# Patient Record
(demographics unavailable — no encounter records)

---

## 2024-10-24 NOTE — HISTORY OF PRESENT ILLNESS
[FreeTextEntry1] : HISTORY OF PRESENT ILLNESS: Mr. Mcclellan is a 58-year-old male complaining of right hip pain. The pain started after no specific injury or event.  The patient has had this pain for months.  Patient describes the pain as moderate to severe.  During the last month the pain has been nearly constant with symptoms worsening in no typical pattern.   Pain described as dull, achy and sharp hip pain which refers into his testicles at times worse with standing and walking, better with sitting without associated numbness, tingling or radicular pain for (6 weeks). Pain is 8/10 in intensity. He has tried NSAIDs without significant relief. Patient denies locking, clicking, instability, falls.  PRESENTING TODAY 10-: Patient presents to the office today for a follow up visit with continued low back and right hip pain that is dull, achy and sharp with radicular features into his testicles at times worse with standing and walking, better with sitting.  Reviewed MRI of his lumbar spine in detail during today's visit as documented below.  He continues with low back pain that remains axial across low back which radiates into his left buttock and posterior thigh, as well as his right buttock/hip.  Pain is an 8/10 pain intensity. Patient denies any bowel or bladder dysfunction, incontinence, or saddle anesthesia.

## 2024-10-24 NOTE — DATA REVIEWED
[FreeTextEntry1] : MRI lumbar spine dated 10/17/2024 suggests, L2-3 disc bulge with superimposed inferiorly migrating left paracentral/foraminal disc extrusion partially effacing the left lateral recess and impinging on the descending left L3 nerve root.  L3-4 moderate-severe spinal stenosis and moderate bilateral foraminal stenosis.  L4-5 disc bulge, mild ligamentum flavum thickening, and mild bilateral facet hypertrophy along with 1 cm cystic lesion arising from the medial aspect of the right L4-5 facet joint (synovial versus flaval cyst) resulting in severe spinal stenosis with impingement of the cauda equina.

## 2024-10-24 NOTE — PHYSICAL EXAM
[de-identified] : LUMBAR SPINE- Inspection:  erythema (-) ecchymosis (-)       Palpation:  Midline lumbar tenderness: (-)    paraspinal tenderness: L (-) ; R (-)     SI joint tenderness: L (-) ; R (-)     GTB tenderness: L (-);  R (-)         Special Tests:  SLR: R (-) ; L (+)     Facet loading: R (-) ; L (-)     MICA test: R (-) ; L (-)    Gaenslen's: R (-) ; L (-)     compression test: R (-) ; L (-)         Gait:  non- antalgic gait

## 2024-10-24 NOTE — DISCUSSION/SUMMARY
[de-identified] : A discussion regarding available pain management treatment options occurred with the patient. These included interventional, rehabilitative, pharmacological, and alternative modalities. We will proceed with the following:   Interventional treatment options:  1.  Patient will proceed with a bilateral TFESI L4-5 with MAC. Treatment options were discussed with the patient. The patient has been having persistent lower back and lumbar radicular pain with minimal improvement with conservative therapies. The patient was given the option to proceed with a lumbar transforaminal epidural steroid injection to try to get some pain relief.        The risks and benefits were discussed which included bleeding, infection, nerve injury, no pain relief or worse, increased pain. All questions were answered and concerns addressed.  Risk, benefits, pros and cons of procedure were explained to the patient using models and diagrams and their questions were answered.   The patient has severe anxiety of procedures that necessitates monitored anesthesia care (MAC). The procedure performed will be close to major nerves, arteries, and spinal cord and/or joint structures. Due to the proximity of these structures, we need the patient to be still during the procedure.  With the help of MAC, this will be safely achieved and decrease the risk of any complications.   *Will Send an anti-diabetic clearance to Dr. Jimenez to proceed with ANTONIO.   Rehabilitative options: -Participation in active HEP was discussed and printed. Patient given specific exercises to do including side plank, Quadruped arm/leg raise, Extension exercise, and Glut Bridges.   Medication based treatment options: - c/w Gabapentin 300mg QHS for a duration of 4 weeks.    Complementary treatment options: - Patient was advised to stay away from any heavy lifting. If needed, he was advised to squat and not bend forward. - Physician directed activity and lifestyle modifications.  Follow up in 4-6 weeks for reassessment.  I, Elias Rizo, attest that this documentation has been prepared under the direction and in the presence of Provider Austin Tyson, DO The documentation recorded by the scribe, in my presence, accurately reflects the service I personally performed, and the decisions made by me with my edits as appropriate.   Best Regards, Austin Tyson D.O.

## 2024-10-24 NOTE — PHYSICAL EXAM
[de-identified] : LUMBAR SPINE- Inspection:  erythema (-) ecchymosis (-)       Palpation:  Midline lumbar tenderness: (-)    paraspinal tenderness: L (-) ; R (-)     SI joint tenderness: L (-) ; R (-)     GTB tenderness: L (-);  R (-)         Special Tests:  SLR: R (-) ; L (+)     Facet loading: R (-) ; L (-)     MICA test: R (-) ; L (-)    Gaenslen's: R (-) ; L (-)     compression test: R (-) ; L (-)         Gait:  non- antalgic gait

## 2024-10-24 NOTE — DISCUSSION/SUMMARY
[de-identified] : A discussion regarding available pain management treatment options occurred with the patient. These included interventional, rehabilitative, pharmacological, and alternative modalities. We will proceed with the following:   Interventional treatment options:  1.  Patient will proceed with a bilateral TFESI L4-5 with MAC. Treatment options were discussed with the patient. The patient has been having persistent lower back and lumbar radicular pain with minimal improvement with conservative therapies. The patient was given the option to proceed with a lumbar transforaminal epidural steroid injection to try to get some pain relief.        The risks and benefits were discussed which included bleeding, infection, nerve injury, no pain relief or worse, increased pain. All questions were answered and concerns addressed.  Risk, benefits, pros and cons of procedure were explained to the patient using models and diagrams and their questions were answered.   The patient has severe anxiety of procedures that necessitates monitored anesthesia care (MAC). The procedure performed will be close to major nerves, arteries, and spinal cord and/or joint structures. Due to the proximity of these structures, we need the patient to be still during the procedure.  With the help of MAC, this will be safely achieved and decrease the risk of any complications.   *Will Send an anti-diabetic clearance to Dr. Jimenez to proceed with ANTONIO.   Rehabilitative options: -Participation in active HEP was discussed and printed. Patient given specific exercises to do including side plank, Quadruped arm/leg raise, Extension exercise, and Glut Bridges.   Medication based treatment options: - c/w Gabapentin 300mg QHS for a duration of 4 weeks.    Complementary treatment options: - Patient was advised to stay away from any heavy lifting. If needed, he was advised to squat and not bend forward. - Physician directed activity and lifestyle modifications.  Follow up in 4-6 weeks for reassessment.  I, Elias Rizo, attest that this documentation has been prepared under the direction and in the presence of Provider Austin Tyson, DO The documentation recorded by the scribe, in my presence, accurately reflects the service I personally performed, and the decisions made by me with my edits as appropriate.   Best Regards, Austin Tyson D.O.